# Patient Record
Sex: FEMALE | Race: WHITE | NOT HISPANIC OR LATINO | URBAN - METROPOLITAN AREA
[De-identification: names, ages, dates, MRNs, and addresses within clinical notes are randomized per-mention and may not be internally consistent; named-entity substitution may affect disease eponyms.]

---

## 2024-04-04 ENCOUNTER — LAB (OUTPATIENT)
Dept: URBAN - METROPOLITAN AREA CLINIC 72 | Facility: CLINIC | Age: 44
End: 2024-04-04

## 2024-04-04 ENCOUNTER — OV CON (OUTPATIENT)
Dept: URBAN - METROPOLITAN AREA CLINIC 72 | Facility: CLINIC | Age: 44
End: 2024-04-04
Payer: COMMERCIAL

## 2024-04-04 VITALS
BODY MASS INDEX: 35.03 KG/M2 | WEIGHT: 223.2 LBS | HEIGHT: 67 IN | DIASTOLIC BLOOD PRESSURE: 74 MMHG | HEART RATE: 89 BPM | SYSTOLIC BLOOD PRESSURE: 107 MMHG | TEMPERATURE: 97.1 F

## 2024-04-04 DIAGNOSIS — K21.9 GASTROESOPHAGEAL REFLUX DISEASE WITHOUT ESOPHAGITIS: ICD-10-CM

## 2024-04-04 DIAGNOSIS — K62.5 BRIGHT RED BLOOD PER RECTUM: ICD-10-CM

## 2024-04-04 DIAGNOSIS — Z12.11 COLON CANCER SCREENING: ICD-10-CM

## 2024-04-04 PROBLEM — 266435005: Status: ACTIVE | Noted: 2024-04-04

## 2024-04-04 PROCEDURE — 99203 OFFICE O/P NEW LOW 30 MIN: CPT | Performed by: INTERNAL MEDICINE

## 2024-04-04 RX ORDER — ALBUTEROL SULFATE 90 UG/1
INHALE TWO PUFFS BY MOUTH EVERY FOUR HOURS AS NEEDED FOR COUGH/WHEEZING/SHORTNESS OF BREATH AEROSOL, METERED RESPIRATORY (INHALATION)
Qty: 8.5 GRAM | Refills: 3 | Status: ON HOLD | COMMUNITY

## 2024-04-04 RX ORDER — FLUTICASONE PROPIONATE 44 UG/1
INHALE ONE PUFF TWICE A DAY. IF YOUR SYMPTOMS ARE NOT ADEQUATELY CONTROLLED, MAY INCREASE THIS TO TWO PUFFS TWICE A DAY AEROSOL, METERED RESPIRATORY (INHALATION)
Qty: 10.6 GRAM | Refills: 1 | Status: ON HOLD | COMMUNITY

## 2024-04-04 RX ORDER — FERROUS SULFATE TAB 325 MG (65 MG ELEMENTAL FE) 325 (65 FE) MG
TAB ORAL
Qty: 60 TABLET | Status: ON HOLD | COMMUNITY

## 2024-04-04 RX ORDER — PREDNISONE 10 MG/1
TABLET ORAL
Qty: 24 TABLET | Status: ON HOLD | COMMUNITY

## 2024-04-04 RX ORDER — FLUTICASONE PROPIONATE 44 UG/1
AEROSOL, METERED RESPIRATORY (INHALATION)
Qty: 10 GRAM | Status: ON HOLD | COMMUNITY

## 2024-04-04 RX ORDER — OMALIZUMAB 300 MG/2ML
INJECTION, SOLUTION SUBCUTANEOUS
Qty: 2 UNSPECIFIED | Status: ACTIVE | COMMUNITY

## 2024-04-04 RX ORDER — MONTELUKAST SODIUM 10 MG/1
TAKE ONE TABLET BY MOUTH EVERY NIGHT AT BEDTIME TABLET, FILM COATED ORAL
Qty: 30 EACH | Refills: 3 | Status: ON HOLD | COMMUNITY

## 2024-04-04 RX ORDER — MONTELUKAST SODIUM 10 MG/1
TABLET, FILM COATED ORAL
Qty: 30 TABLET | Status: ACTIVE | COMMUNITY

## 2024-04-04 RX ORDER — FAMOTIDINE 20 MG/1
TAKE ONE TABLET BY MOUTH TWICE DAILY TABLET, FILM COATED ORAL
Qty: 60 EACH | Refills: 3 | Status: ON HOLD | COMMUNITY

## 2024-04-04 RX ORDER — ALBUTEROL SULFATE 2.5 MG/3ML
USE THREE MILLILITERS VIA NEBULIZER EVERY 6 HOURS AS NEEDED FOR WHEEZING SOLUTION RESPIRATORY (INHALATION)
Qty: 360 MILLILITER | Refills: 3 | Status: ON HOLD | COMMUNITY

## 2024-04-04 RX ORDER — EPINEPHRINE 0.3 MG/.3ML
INJECT ONE DOSE IF DEVELOP A SEVERE ALLERGIC REACTION. THEN CALL 911 INJECTION SUBCUTANEOUS
Qty: 2 EACH | Refills: 0 | Status: ON HOLD | COMMUNITY

## 2024-04-04 RX ORDER — PREDNISONE 10 MG/1
DAYS 1-3 TAKE THREE EVERY MORNING. DAYS 4-6 TAKE TWO TABS EVERY MORNING, DAYS 7-9 TAKE ONE EVERY MORNING, DAYS 10-12 TAKE ONE-HALF IN THE MORNING, DAYS 13-15 TAKE ONE-HALF TABLET EVERY OTHER DAY TABLET ORAL
Qty: 24 EACH | Refills: 0 | Status: ON HOLD | COMMUNITY

## 2024-04-04 RX ORDER — EPINEPHRINE 0.3 MG/.3ML
INJECTION SUBCUTANEOUS
Qty: 2 UNSPECIFIED | Status: ACTIVE | COMMUNITY

## 2024-04-04 RX ORDER — SEMAGLUTIDE 0.68 MG/ML
INJECT 0.25 MG ONCE WEEKLY INJECTION, SOLUTION SUBCUTANEOUS
Qty: 3 MILLILITER | Refills: 1 | Status: ACTIVE | COMMUNITY

## 2024-04-04 RX ORDER — MUPIROCIN 20 MG/G
OINTMENT TOPICAL
Qty: 22 GRAM | Status: ON HOLD | COMMUNITY

## 2024-04-04 RX ORDER — AZELASTINE HYDROCHLORIDE 137 UG/1
INHALE TWO SPRAYS IN EACH NOSTRIL TWICE DAILY AS NEEDED SPRAY, METERED NASAL
Qty: 30 MILLILITER | Refills: 1 | Status: ON HOLD | COMMUNITY

## 2024-04-04 RX ORDER — SEMAGLUTIDE 0.68 MG/ML
INJECTION, SOLUTION SUBCUTANEOUS
Qty: 3 UNSPECIFIED | Status: ACTIVE | COMMUNITY

## 2024-04-04 RX ORDER — ALBUTEROL SULFATE 90 UG/1
AEROSOL, METERED RESPIRATORY (INHALATION)
Qty: 8 UNSPECIFIED | Status: ON HOLD | COMMUNITY

## 2024-04-04 RX ORDER — HYDROXYZINE HYDROCHLORIDE 10 MG/1
TABLET ORAL
Qty: 30 TABLET | Status: ON HOLD | COMMUNITY

## 2024-04-04 RX ORDER — FERROUS SULFATE TAB 325 MG (65 MG ELEMENTAL FE) 325 (65 FE) MG
TAKE ONE TABLET BY MOUTH TWICE DAILY TAB ORAL
Qty: 60 EACH | Refills: 0 | Status: ACTIVE | COMMUNITY

## 2024-04-04 RX ORDER — FAMOTIDINE 20 MG/1
TABLET, FILM COATED ORAL
Qty: 60 TABLET | Status: ACTIVE | COMMUNITY

## 2024-04-04 RX ORDER — HYDROXYZINE HYDROCHLORIDE 10 MG/1
TAKE ONE TABLET BY MOUTH EVERY 8 HOURS AS NEEDED FOR ITCHING OR FOR HIVES TABLET ORAL
Qty: 30 EACH | Refills: 0 | Status: ON HOLD | COMMUNITY

## 2024-04-04 RX ORDER — FLUTICASONE PROPIONATE 44 UG/1
AEROSOL, METERED RESPIRATORY (INHALATION)
Qty: 10 GRAM | Status: ACTIVE | COMMUNITY

## 2024-04-04 RX ORDER — FLUTICASONE PROPIONATE 44 UG/1
INHALE TWO PUFFS BY MOUTH TWICE DAILY AEROSOL, METERED RESPIRATORY (INHALATION)
Qty: 10.6 GRAM | Refills: 0 | Status: ON HOLD | COMMUNITY

## 2024-04-04 RX ORDER — MUPIROCIN 20 MG/G
APPLY SMALL AMOUNT TO AFFECTED AREA TOPICALLY THREE TIMES DAILY FOR 10 DAYS OINTMENT TOPICAL
Qty: 22 GRAM | Refills: 0 | Status: ON HOLD | COMMUNITY

## 2024-04-04 RX ORDER — AZELASTINE HYDROCHLORIDE 137 UG/1
SPRAY, METERED NASAL
Qty: 30 MILLILITER | Status: ON HOLD | COMMUNITY

## 2024-04-04 RX ORDER — ALBUTEROL SULFATE 2.5 MG/3ML
SOLUTION RESPIRATORY (INHALATION)
Qty: 360 | Status: ACTIVE | COMMUNITY

## 2024-04-04 NOTE — HPI-OTHER HISTORIES
Labs 3/5/2024. Iron low at 19 with iron saturation 5 with normal ferritin. CBC: Hgb normal 11.3, WBC 13.2, MCV 71. CMP and vitamin B12 Normal. Hemoglobin A1c 6.

## 2024-04-04 NOTE — HPI-TODAY'S VISIT:
43-year-old female new to the clinic referred by Dr. Ramírez for screening colonoscopy.  A copy of this note will be sent to the referring provider.   Past medical asthma, iron deficiency and diabetes mellitus.  Starting a biologic soon for idiopathic urticaria.    Rare BRBPR. Denies abdominal pain or recent change in bowel movements. On oral iron and has been on Ozempic since January.  Has GERD at night. On H2RA.   No CP, SOB, palpitations, syncope, near-syncope or problems with anesthesia previously.

## 2024-06-12 ENCOUNTER — TELEPHONE ENCOUNTER (OUTPATIENT)
Dept: URBAN - METROPOLITAN AREA CLINIC 113 | Facility: CLINIC | Age: 44
End: 2024-06-12

## 2024-06-27 ENCOUNTER — OFFICE VISIT (OUTPATIENT)
Dept: URBAN - METROPOLITAN AREA MEDICAL CENTER 40 | Facility: MEDICAL CENTER | Age: 44
End: 2024-06-27

## 2024-07-15 ENCOUNTER — OFFICE VISIT (OUTPATIENT)
Dept: URBAN - METROPOLITAN AREA CLINIC 72 | Facility: CLINIC | Age: 44
End: 2024-07-15

## 2025-05-17 NOTE — PHYSICAL EXAM CONSTITUTIONAL:
normal , pleasant, well nourished, in no acute distress
precipitous delivery/premature rupture of membranes prior to labor